# Patient Record
Sex: FEMALE | Race: WHITE | NOT HISPANIC OR LATINO | URBAN - METROPOLITAN AREA
[De-identification: names, ages, dates, MRNs, and addresses within clinical notes are randomized per-mention and may not be internally consistent; named-entity substitution may affect disease eponyms.]

---

## 2017-12-05 ENCOUNTER — EMERGENCY (EMERGENCY)
Facility: HOSPITAL | Age: 62
LOS: 1 days | Discharge: HOME | End: 2017-12-05
Attending: EMERGENCY MEDICINE | Admitting: EMERGENCY MEDICINE
Payer: MEDICAID

## 2017-12-05 VITALS
HEART RATE: 90 BPM | TEMPERATURE: 98 F | DIASTOLIC BLOOD PRESSURE: 75 MMHG | SYSTOLIC BLOOD PRESSURE: 119 MMHG | OXYGEN SATURATION: 96 % | RESPIRATION RATE: 18 BRPM

## 2017-12-05 PROCEDURE — 99283 EMERGENCY DEPT VISIT LOW MDM: CPT

## 2017-12-05 NOTE — ED PROVIDER NOTE - PLAN OF CARE
You were seen today for follow up of your cat bite. You are healing well. Please continue to take the Augmentin twice per day as indicated. Please take ibuprofen up to 600mg every 6 hours for pain as needed. Please elevate the arm. Come back to the emergency room if you start having fevers, chills, worsening pain, numbness, tingling, difficulty rotating arm at elbow or wrist, or for any other emergent reason.  Please follow up with your primary care doctor in the next three days to check on arm.

## 2017-12-05 NOTE — ED PROVIDER NOTE - MEDICAL DECISION MAKING DETAILS
62F presenting for wound check s/p cat bite on 12/3. healing well. Will encourage to elevate, continue antibiotics, take ibuprofen for pain. Return precautions given. F/u with PMD, 62F presenting for wound check s/p cat bite on 12/3. healing well. No Fever/DM, Will encourage to elevate, continue antibiotics, take ibuprofen for pain. Return precautions given. F/u with PMD,

## 2017-12-05 NOTE — ED ADULT TRIAGE NOTE - CHIEF COMPLAINT QUOTE
right arm bite wound treated at [facility] with augmentin from 12/3. Came in due to increasing pain.

## 2017-12-05 NOTE — ED PROVIDER NOTE - MUSCULOSKELETAL MINIMAL EXAM
R arm: +erythema of forearm with puncture wounds that are closing up. Tenderness to palpation over the areas of erythema. No fluctuance or pus can be expressed.

## 2017-12-05 NOTE — ED PROVIDER NOTE - OBJECTIVE STATEMENT
62F PMH chronic back pain, hypothyroidism, hypertension, anxiety presents status post cat bite she sustained on 12/3. Patient went  for wound check today at Urgent Care and had a lot of pus expressed and was told to come to the ED. Patient is able to move arm at elbow and wrist. She is taking Augmentin, but has increased her dose by herself from 2 pills per day to 3. Has been taking oxycodone without relief of pain. Denies fevers and chills.

## 2017-12-05 NOTE — ED PROVIDER NOTE - ATTENDING CONTRIBUTION TO CARE
62F presenting for wound check s/p cat bite on 12/3. healing well. No Fever/DM, Will encourage to elevate, continue antibiotics, take ibuprofen for pain. Return precautions given. F/u with PMD,

## 2017-12-05 NOTE — ED ADULT NURSE NOTE - CHIEF COMPLAINT QUOTE
right arm bite wound treated at [facility] with augmentin from 12/3. Came in due to increasing pain. (+) redness. Afebrile

## 2017-12-05 NOTE — ED PROVIDER NOTE - CARE PLAN
Principal Discharge DX:	Cat bite, initial encounter  Instructions for follow-up, activity and diet:	You were seen today for follow up of your cat bite. You are healing well. Please continue to take the Augmentin twice per day as indicated. Please take ibuprofen up to 600mg every 6 hours for pain as needed. Please elevate the arm. Come back to the emergency room if you start having fevers, chills, worsening pain, numbness, tingling, difficulty rotating arm at elbow or wrist, or for any other emergent reason.  Please follow up with your primary care doctor in the next three days to check on arm.

## 2017-12-06 ENCOUNTER — EMERGENCY (EMERGENCY)
Facility: HOSPITAL | Age: 62
LOS: 1 days | Discharge: HOME | End: 2017-12-06
Attending: EMERGENCY MEDICINE | Admitting: EMERGENCY MEDICINE
Payer: MEDICAID

## 2017-12-06 VITALS
OXYGEN SATURATION: 100 % | RESPIRATION RATE: 20 BRPM | SYSTOLIC BLOOD PRESSURE: 151 MMHG | TEMPERATURE: 99 F | HEART RATE: 87 BPM | DIASTOLIC BLOOD PRESSURE: 76 MMHG

## 2017-12-06 VITALS
OXYGEN SATURATION: 96 % | RESPIRATION RATE: 18 BRPM | DIASTOLIC BLOOD PRESSURE: 65 MMHG | TEMPERATURE: 98 F | HEART RATE: 88 BPM | SYSTOLIC BLOOD PRESSURE: 136 MMHG

## 2017-12-06 LAB
ALBUMIN SERPL ELPH-MCNC: 4.4 G/DL — SIGNIFICANT CHANGE UP (ref 3.3–5)
ALP SERPL-CCNC: 73 U/L — SIGNIFICANT CHANGE UP (ref 40–120)
ALT FLD-CCNC: 15 U/L RC — SIGNIFICANT CHANGE UP (ref 10–45)
ANION GAP SERPL CALC-SCNC: 13 MMOL/L — SIGNIFICANT CHANGE UP (ref 5–17)
AST SERPL-CCNC: 16 U/L — SIGNIFICANT CHANGE UP (ref 10–40)
BASE EXCESS BLDV CALC-SCNC: 2.7 MMOL/L — HIGH (ref -2–2)
BILIRUB SERPL-MCNC: 0.2 MG/DL — SIGNIFICANT CHANGE UP (ref 0.2–1.2)
BUN SERPL-MCNC: 8 MG/DL — SIGNIFICANT CHANGE UP (ref 7–23)
CA-I SERPL-SCNC: 1.28 MMOL/L — SIGNIFICANT CHANGE UP (ref 1.12–1.3)
CALCIUM SERPL-MCNC: 9.9 MG/DL — SIGNIFICANT CHANGE UP (ref 8.4–10.5)
CHLORIDE BLDV-SCNC: 102 MMOL/L — SIGNIFICANT CHANGE UP (ref 96–108)
CHLORIDE SERPL-SCNC: 100 MMOL/L — SIGNIFICANT CHANGE UP (ref 96–108)
CO2 BLDV-SCNC: 31 MMOL/L — HIGH (ref 22–30)
CO2 SERPL-SCNC: 27 MMOL/L — SIGNIFICANT CHANGE UP (ref 22–31)
CREAT SERPL-MCNC: 0.88 MG/DL — SIGNIFICANT CHANGE UP (ref 0.5–1.3)
GAS PNL BLDV: 139 MMOL/L — SIGNIFICANT CHANGE UP (ref 136–145)
GAS PNL BLDV: SIGNIFICANT CHANGE UP
GAS PNL BLDV: SIGNIFICANT CHANGE UP
GLUCOSE BLDV-MCNC: 107 MG/DL — HIGH (ref 70–99)
GLUCOSE SERPL-MCNC: 106 MG/DL — HIGH (ref 70–99)
HCO3 BLDV-SCNC: 29 MMOL/L — SIGNIFICANT CHANGE UP (ref 21–29)
HCT VFR BLD CALC: 40.6 % — SIGNIFICANT CHANGE UP (ref 34.5–45)
HCT VFR BLDA CALC: 42 % — SIGNIFICANT CHANGE UP (ref 39–50)
HGB BLD CALC-MCNC: 13.6 G/DL — SIGNIFICANT CHANGE UP (ref 11.5–15.5)
HGB BLD-MCNC: 13.9 G/DL — SIGNIFICANT CHANGE UP (ref 11.5–15.5)
LACTATE BLDV-MCNC: 1.1 MMOL/L — SIGNIFICANT CHANGE UP (ref 0.7–2)
MCHC RBC-ENTMCNC: 33.2 PG — SIGNIFICANT CHANGE UP (ref 27–34)
MCHC RBC-ENTMCNC: 34.3 GM/DL — SIGNIFICANT CHANGE UP (ref 32–36)
MCV RBC AUTO: 96.9 FL — SIGNIFICANT CHANGE UP (ref 80–100)
PCO2 BLDV: 56 MMHG — HIGH (ref 35–50)
PH BLDV: 7.34 — LOW (ref 7.35–7.45)
PLATELET # BLD AUTO: 254 K/UL — SIGNIFICANT CHANGE UP (ref 150–400)
PO2 BLDV: 16 MMHG — LOW (ref 25–45)
POTASSIUM BLDV-SCNC: 4.2 MMOL/L — SIGNIFICANT CHANGE UP (ref 3.5–5)
POTASSIUM SERPL-MCNC: 4.4 MMOL/L — SIGNIFICANT CHANGE UP (ref 3.5–5.3)
POTASSIUM SERPL-SCNC: 4.4 MMOL/L — SIGNIFICANT CHANGE UP (ref 3.5–5.3)
PROT SERPL-MCNC: 7.7 G/DL — SIGNIFICANT CHANGE UP (ref 6–8.3)
RBC # BLD: 4.19 M/UL — SIGNIFICANT CHANGE UP (ref 3.8–5.2)
RBC # FLD: 11.2 % — SIGNIFICANT CHANGE UP (ref 10.3–14.5)
SAO2 % BLDV: 23 % — LOW (ref 67–88)
SODIUM SERPL-SCNC: 140 MMOL/L — SIGNIFICANT CHANGE UP (ref 135–145)
WBC # BLD: 7.3 K/UL — SIGNIFICANT CHANGE UP (ref 3.8–10.5)
WBC # FLD AUTO: 7.3 K/UL — SIGNIFICANT CHANGE UP (ref 3.8–10.5)

## 2017-12-06 PROCEDURE — 99284 EMERGENCY DEPT VISIT MOD MDM: CPT

## 2017-12-06 PROCEDURE — 85014 HEMATOCRIT: CPT

## 2017-12-06 PROCEDURE — 86140 C-REACTIVE PROTEIN: CPT

## 2017-12-06 PROCEDURE — 85027 COMPLETE CBC AUTOMATED: CPT

## 2017-12-06 PROCEDURE — 84295 ASSAY OF SERUM SODIUM: CPT

## 2017-12-06 PROCEDURE — 84132 ASSAY OF SERUM POTASSIUM: CPT

## 2017-12-06 PROCEDURE — 99284 EMERGENCY DEPT VISIT MOD MDM: CPT | Mod: 25

## 2017-12-06 PROCEDURE — 73080 X-RAY EXAM OF ELBOW: CPT | Mod: 26,RT

## 2017-12-06 PROCEDURE — 73080 X-RAY EXAM OF ELBOW: CPT

## 2017-12-06 PROCEDURE — 80053 COMPREHEN METABOLIC PANEL: CPT

## 2017-12-06 PROCEDURE — 85652 RBC SED RATE AUTOMATED: CPT

## 2017-12-06 PROCEDURE — 82330 ASSAY OF CALCIUM: CPT

## 2017-12-06 PROCEDURE — 82947 ASSAY GLUCOSE BLOOD QUANT: CPT

## 2017-12-06 PROCEDURE — 83605 ASSAY OF LACTIC ACID: CPT

## 2017-12-06 PROCEDURE — 82803 BLOOD GASES ANY COMBINATION: CPT

## 2017-12-06 PROCEDURE — 82435 ASSAY OF BLOOD CHLORIDE: CPT

## 2017-12-06 RX ORDER — SODIUM CHLORIDE 9 MG/ML
500 INJECTION INTRAMUSCULAR; INTRAVENOUS; SUBCUTANEOUS ONCE
Qty: 0 | Refills: 0 | Status: COMPLETED | OUTPATIENT
Start: 2017-12-06 | End: 2017-12-06

## 2017-12-06 RX ORDER — IBUPROFEN 200 MG
600 TABLET ORAL ONCE
Qty: 0 | Refills: 0 | Status: COMPLETED | OUTPATIENT
Start: 2017-12-06 | End: 2017-12-06

## 2017-12-06 RX ADMIN — SODIUM CHLORIDE 500 MILLILITER(S): 9 INJECTION INTRAMUSCULAR; INTRAVENOUS; SUBCUTANEOUS at 22:38

## 2017-12-06 RX ADMIN — Medication 600 MILLIGRAM(S): at 23:16

## 2017-12-06 RX ADMIN — Medication 100 MILLIGRAM(S): at 22:45

## 2017-12-06 RX ADMIN — Medication 600 MILLIGRAM(S): at 22:32

## 2017-12-06 NOTE — ED PROVIDER NOTE - PROGRESS NOTE DETAILS
Pain, swelling, and erythema improved significantly after nsaids and abx.  Long d/w her re abx and return precautions.  Recommended CDU for continued IV abx and observation/monitoring but she declined stating that her diabetic cat needed her at home.  Long d/w her re r/b/a.  She continues to decline obs.  Will d/c on clinda, strict return precautions and wound care instructions provided.  --ALISONM

## 2017-12-06 NOTE — ED PROVIDER NOTE - OBJECTIVE STATEMENT
Patient had a cat bite to her forearm on Dec 3, and was evaluated here 2 days ago and today, as well as at an urgent care. When ecvaluated here she was found to have cellulitis and discharged on antibiotics. Patient got worried when evaluated at the urgent care so came to us for re-evaluation. She reports that the redness and pain extended up her elbow and that the swel;ling around the elbow area has increased in size. No fevers or chills. SHe is not a diabetic. Uptodate on her tetanus.

## 2017-12-06 NOTE — ED ADULT NURSE NOTE - OBJECTIVE STATEMENT
61 y/o female A&Ox4, PMH anxiety, GERD, HTN, migraines, presents to ED with c/o worsening pain and swelling in right elbow s/p cat bit on the third. Pt states she was treated here and sent home on Augmentin and has been taking it 3x/day. Pt states she noticed worsening symptoms and went to Urgent Care who referred her to ED for follow up. Bite site is wrapped, dressing c/d/i. Pt states she's able to move arm but with great pain, rated 10/10. C/O numbness in right hand. Pt is otherwise well appearing, alert and ambulatory without any other complaints.

## 2017-12-06 NOTE — ED PROVIDER NOTE - MEDICAL DECISION MAKING DETAILS
Patient with redness from a cat bite for the past 3 days o antibiotics with possible increase in the size of the redness. Will do CBC and inflammatory amrkers, then give IV antibiotics and do an Xray to r/o gas. Patient with redness from a cat bite for the past 3 days o antibiotics with possible increase in the size of the redness. Will do CBC and inflammatory amrkers, then give IV antibiotics and do an Xray to r/o nec fasc.    Attending Statement: Agree with the above.  Likely MRSA given abscess that is draining.  No improvement c augmentin.  Will give clinda, fluids, check labs, and XR to eval for subcutaneous air.  Reassess.  CDU admission recommended but patient declines.  See progress note.  --BMM

## 2017-12-06 NOTE — ED PROVIDER NOTE - CARE PLAN
Principal Discharge DX:	Cellulitis of other specified site Principal Discharge DX:	Cellulitis of other specified site  Goal:	Left forearm

## 2017-12-07 LAB — CRP SERPL-MCNC: 2.2 MG/DL — HIGH (ref 0–0.4)

## 2018-05-28 ENCOUNTER — EMERGENCY (EMERGENCY)
Facility: HOSPITAL | Age: 63
LOS: 1 days | Discharge: HOME | End: 2018-05-28
Attending: EMERGENCY MEDICINE
Payer: MEDICAID

## 2018-05-28 VITALS
SYSTOLIC BLOOD PRESSURE: 150 MMHG | RESPIRATION RATE: 16 BRPM | OXYGEN SATURATION: 99 % | HEART RATE: 70 BPM | TEMPERATURE: 98 F | DIASTOLIC BLOOD PRESSURE: 71 MMHG

## 2018-05-28 VITALS
DIASTOLIC BLOOD PRESSURE: 97 MMHG | SYSTOLIC BLOOD PRESSURE: 161 MMHG | HEART RATE: 78 BPM | RESPIRATION RATE: 16 BRPM | OXYGEN SATURATION: 98 % | TEMPERATURE: 98 F

## 2018-05-28 PROCEDURE — 99282 EMERGENCY DEPT VISIT SF MDM: CPT

## 2018-05-28 NOTE — ED ADULT NURSE NOTE - OBJECTIVE STATEMENT
62 y/o female presenting to ED stating she needs a prescription refill. Pt states she had an incident with her daughter, and was advised by police to come her to obtain medication. Safety and comfort measures maintained.

## 2018-05-28 NOTE — ED PROVIDER NOTE - PHYSICAL EXAMINATION
Pt in NAD, A&O x3. CN 2-12 grossly intact. Head NCAT, sclera white without injection, PERRLA, EOMI. Nares patent, turbinates without edema or erythema, no polyps or septal deviation. No auricular tenderness. Mouth and pharynx without erythema or exudates, uvula midline, no tonsillar enlargement. Trachea midline, no lymphadenopathy, no obvious JVD. No chest wall tenderness, CTA anterior and posterior b/l, no wheezes, rales, or rhonchi. Clear distinct S1, S2, regular rate, no S3, S4, murmurs, gallops, or rubs. Abdomen soft non-tender. No CVAT b/l. No midline or paraspinal back pain. No LE edema Pt in NAD, A&O x3. CN 2-12 grossly intact. Head NCAT, sclera white without injection, PERRLA, EOMI. Nares patent, turbinates without edema or erythema, no polyps or septal deviation. No auricular tenderness. Mouth and pharynx without erythema or exudates, uvula midline, no tonsillar enlargement. Trachea midline, no lymphadenopathy, no obvious JVD. No chest wall tenderness, CTA anterior and posterior b/l, b/l wheezes cleared with cough, rales, or rhonchi. Clear distinct S1, S2, regular rate, no S3, S4, murmurs, gallops, or rubs. Abdomen soft non-tender. No CVAT b/l. No midline or paraspinal back pain. No LE edema

## 2018-05-28 NOTE — ED PROVIDER NOTE - MEDICAL DECISION MAKING DETAILS
Pt with no complaints, nl physical exam and vitals wnl presents to ED requesting med refill, multiple controlled substances. Pt instructed to f/u with PCP tomorrow.

## 2018-05-28 NOTE — ED PROVIDER NOTE - CARE PLAN
Principal Discharge DX:	Worried well  Assessment and plan of treatment:	Follow-up with your primary care provider tomorrow for medication refill.  Continue to take all other medications as directed.  Return to the emergency room immediately if your symptoms worsen or persist, or if you have a high or concerning fever, Headache, change in vision, chest pain, back pain, palpitations, severe vomiting, loss of consciousness (passing out) or if any other concerning or questionable symptoms.

## 2018-05-28 NOTE — ED PROVIDER NOTE - ATTENDING CONTRIBUTION TO CARE
64yo F with medication refill.  Pt states she does not have access to her meds.  She is on htn meds along with narcotics and valium.  She states her pain management doctor is unavailable and her pcp prescribed 5 days of meds that would have lasted her until Saturday.  She denies any sx.  NAD, completely nontoxic, afebrile, NCAT, PERRL, CN grossly intact, equal chest rise, abd soft nontender with no rebound/guarding/masses, no LE edema.  Pt bp stable, advised to f/u with pcp tomm and return if worse.  Pt agreeable to plan.

## 2018-05-28 NOTE — ED PROVIDER NOTE - PLAN OF CARE
Follow-up with your primary care provider tomorrow for medication refill.  Continue to take all other medications as directed.  Return to the emergency room immediately if your symptoms worsen or persist, or if you have a high or concerning fever, Headache, change in vision, chest pain, back pain, palpitations, severe vomiting, loss of consciousness (passing out) or if any other concerning or questionable symptoms.

## 2018-05-28 NOTE — ED PROVIDER NOTE - OBJECTIVE STATEMENT
62 y/o female h/o HTN, HLD, hypothyroid, "irregular heart" and "something with the spine" presents requesting prescription medication refill. States she had an incident with her schizophrenic drug addicted daughter yesterday that involved her going to the police precinct and court. Her medications are at home, but she currently has a police order and cannot go into her own home until tomorrow, bc her daughter is there. Requesting: Mobic, Cozar, Reglan, Valium, Adderall, Oxycodone, meclizine, simvastatin, metoprolol, and synthroid. Pt denies any current sxs including syncope, dizziness, lightheadedness, change in vision, chest/back/abdo pain, HA, palpitations, numbness, paresthesias, or other pain. 64 y/o female h/o HTN, HLD, hypothyroid, "irregular heart" and "something with the spine" presents requesting prescription medication refill. States she had an incident with her schizophrenic drug addicted daughter yesterday that involved her going to the police precinct and court. Her medications are at home, but she currently has a police order and cannot go into her own home until tomorrow, bc her daughter is there. Requesting: Mobic, Cozar, Reglan, Valium, Adderall, Oxycodone, meclizine, simvastatin, metoprolol, and synthroid. Pt denies any current sxs including syncope, dizziness, lightheadedness, change in vision, chest/back/abdo pain, HA, palpitations, numbness, paresthesias, or other new pain outside baseline.

## 2018-12-21 NOTE — ED ADULT NURSE NOTE - NS PRO PASSIVE SMOKE EXP
Phone Numbers:   [phone] (W)  [phone] (M)    Preferred Language:  English [1]    [email or web address]    Payor: [employer] / Plan: CHOICE PLUS/b-datum / Product Type: PPO MISC   Specialty Referral Req'd?:  N/A  Secondary?   No  Work Comp?:  No    No Pcp    needs pre op   Phone: None  Fax: None    Estimated body mass index is 40.35 kg/m² as calculated from the following:    Height as of 11/6/18: 5' 6\" (1.676 m).    Weight as of 11/6/18: 113.4 kg.    Scheduling at [facility]?:  No    PreOp Diagnosis:   Internal derangement of right shoulder  - Primary M24.811         Orders from Provider (Copied from Message or CC Chart):  ----- Message -----  From: Neftaly Donato MD  Sent: 12/20/2018  12:22 PM  To: Ortho Surg Sched Pool - Tanmay Higginbotham, *  Subject: right shoulder scope                             Procedure and Consent to read: Right shoulder arthroscopic biceps tenodesis, rotator cuff repair with augmentation right shoulder arthroscopic subacromial decompression  Diagnosis: Right shoulder rotator cuff tear right shoulder biceps tendinitis  ICD-9 or ICD-10: m24.819   CPT: 35836,74778, 99764  Tentative Date: 1/19  Duration of Procedure: 1.5 hours  Hospital: [facility]  Anesthesia: General and Regional-Emely Block  NP/PA Assist: YES  Length of Stay: Day Surgery  Position: Supine  X-ray Required: None  Films Needed for Operating Room:  Equipment: regeneten by davenport and nephew, arthrex anchors  Durable Medical Equipment: Shoulder immoblilizer, Cold Therapy, CPM (continuous passive motion  Postoperative Therapy: Occupational Therapy to evaluate and treat day 5  Postoperative Dressing: soft  Preoperative Done By: pcp  Consent: To be signed at hospital    Hospital Orders: Antibiotics Ancef 2 gram IV. If patient is allergic to PCN, please give 900 mg of clindamycin    Please make first postop office visit for 14 days postop  Anesthesia Orders  NPO  CXR within 6 months  Comprehensive Metabolic Panel  CBC with auto diff  - if not done 14 days prior to admission  UA with culture  INR if patient is on coumadin  EKG within 6 months (males over 40 yrs old/females over 50 yrs old)  Type and Screen for total joints  Urine pregnancy test for women of child-bearing age only  Medical consultation for preop risk assessment (if primary care provider does not perform)  Knee high SILVIO stockings and SCD's for all arthroplasties, arthroscopic shoulder surgeries and procedures scheduled for 1-1/2 hours or more    Patient to  hibiclens  No makeup, no artificial nails, no jewelry  *Nasal swab for MSSA and MRSA on all total joint replacements       Unknown

## 2020-11-28 NOTE — ED PROVIDER NOTE - GASTROINTESTINAL NEGATIVE STATEMENT, MLM
no abdominal pain, no bloating, no constipation, no diarrhea, no nausea and no vomiting. (1) Other Diagnosis

## 2021-05-07 DIAGNOSIS — L02.416 CUTANEOUS ABSCESS OF LEFT LOWER LIMB: ICD-10-CM

## 2022-02-10 PROBLEM — I10 ESSENTIAL (PRIMARY) HYPERTENSION: Chronic | Status: ACTIVE | Noted: 2017-12-05

## 2022-02-28 ENCOUNTER — APPOINTMENT (OUTPATIENT)
Dept: GASTROENTEROLOGY | Facility: CLINIC | Age: 67
End: 2022-02-28
Payer: MEDICARE

## 2022-02-28 VITALS
BODY MASS INDEX: 67.81 KG/M2 | HEIGHT: 55 IN | DIASTOLIC BLOOD PRESSURE: 70 MMHG | SYSTOLIC BLOOD PRESSURE: 115 MMHG | WEIGHT: 293 LBS

## 2022-02-28 VITALS — RESPIRATION RATE: 12 BRPM | HEART RATE: 80 BPM

## 2022-02-28 DIAGNOSIS — J40 BRONCHITIS, NOT SPECIFIED AS ACUTE OR CHRONIC: ICD-10-CM

## 2022-02-28 PROCEDURE — 99214 OFFICE O/P EST MOD 30 MIN: CPT

## 2022-02-28 NOTE — HISTORY OF PRESENT ILLNESS
[___ Month(s) Ago] : [unfilled] month(s) ago [Heartburn] : stable heartburn [Nausea] : stable nausea [Vomiting] : denies vomiting [Diarrhea] : denies diarrhea [Constipation] : denies constipation [Yellow Skin Or Eyes (Jaundice)] : denies jaundice [Abdominal Pain] : denies abdominal pain [Abdominal Swelling] : denies abdominal swelling [Rectal Pain] : denies rectal pain [GERD] : gastroesophageal reflux disease [Hiatus Hernia] : no hiatus hernia [Peptic Ulcer Disease] : no peptic ulcer disease [Pancreatitis] : no pancreatitis [Cholelithiasis] : no cholelithiasis [Kidney Stone] : no kidney stone [Inflammatory Bowel Disease] : no inflammatory bowel disease [Irritable Bowel Syndrome] : no irritable bowel syndrome [Diverticulitis] : no diverticulitis [Alcohol Abuse] : no alcohol abuse [Malignancy] : no malignancy [Abdominal Surgery] : no abdominal surgery [Appendectomy] : no appendectomy [Cholecystectomy] : no cholecystectomy [_________] : Performed [unfilled] [de-identified] : 66 yo female, hx of gerd, occasional nausea and hemorrhoidal pruritus. Due for colonoscopy at this time, as last prep was fair. No change in bowel habits, no weight loss ; PT  is currently smoking 1/2 PPD.

## 2022-02-28 NOTE — CONSULT LETTER
[Dear  ___] : Dear  [unfilled], [Courtesy Letter:] : I had the pleasure of seeing your patient, [unfilled], in my office today. [Please see my note below.] : Please see my note below. [Consult Closing:] : Thank you very much for allowing me to participate in the care of this patient.  If you have any questions, please do not hesitate to contact me. [Sincerely,] : Sincerely, [FreeTextEntry3] : Guevara Khan MD, FACP, AGAF, FAASLD\par  of Medicine\par [facility]\par

## 2022-02-28 NOTE — ASSESSMENT
[FreeTextEntry1] : 66 yo female chronic gerd, occ nausea, with external hemorrhoids. She is due for colonoscopy at this time. Will treat empirically with PPI, topical proctozone. Pt to be reevaluated in 6 weeks at which time she states she will schedule her colonoscopy.

## 2022-02-28 NOTE — PHYSICAL EXAM
[General Appearance - Alert] : alert [General Appearance - In No Acute Distress] : in no acute distress [Sclera] : the sclera and conjunctiva were normal [PERRL With Normal Accommodation] : pupils were equal in size, round, and reactive to light [Extraocular Movements] : extraocular movements were intact [Outer Ear] : the ears and nose were normal in appearance [Neck Appearance] : the appearance of the neck was normal [Oropharynx] : the oropharynx was normal [Neck Cervical Mass (___cm)] : no neck mass was observed [Jugular Venous Distention Increased] : there was no jugular-venous distention [Thyroid Diffuse Enlargement] : the thyroid was not enlarged [Thyroid Nodule] : there were no palpable thyroid nodules [Auscultation Breath Sounds / Voice Sounds] : lungs were clear to auscultation bilaterally [Heart Rate And Rhythm] : heart rate was normal and rhythm regular [Heart Sounds] : normal S1 and S2 [Heart Sounds Gallop] : no gallops [Murmurs] : no murmurs [Heart Sounds Pericardial Friction Rub] : no pericardial rub [Full Pulse] : the pedal pulses are present [Edema] : there was no peripheral edema [Bowel Sounds] : normal bowel sounds [Abdomen Soft] : soft [Abdomen Tenderness] : non-tender [] : no hepato-splenomegaly [Abdomen Mass (___ Cm)] : no abdominal mass palpated [Normal Sphincter Tone] : normal sphincter tone [No Rectal Mass] : no rectal mass [External Hemorrhoid] : external hemorrhoids [Cervical Lymph Nodes Enlarged Posterior Bilaterally] : posterior cervical [Cervical Lymph Nodes Enlarged Anterior Bilaterally] : anterior cervical [Supraclavicular Lymph Nodes Enlarged Bilaterally] : supraclavicular [Axillary Lymph Nodes Enlarged Bilaterally] : axillary [Femoral Lymph Nodes Enlarged Bilaterally] : femoral [Inguinal Lymph Nodes Enlarged Bilaterally] : inguinal [No CVA Tenderness] : no ~M costovertebral angle tenderness [No Spinal Tenderness] : no spinal tenderness [Abnormal Walk] : normal gait [Nail Clubbing] : no clubbing  or cyanosis of the fingernails [Musculoskeletal - Swelling] : no joint swelling seen [Motor Tone] : muscle strength and tone were normal [Impaired Insight] : insight and judgment were intact [Oriented To Time, Place, And Person] : oriented to person, place, and time [Affect] : the affect was normal

## 2022-03-11 NOTE — ED ADULT NURSE NOTE - PT NEEDS ASSIST
Routing to Dr Cortez.             Called patient and she was at work and could not schedule an appointment right now.  Patient also said that no one had called her from mental health scheduling to schedule a therapy appointment.  I relayed Dr Cortez message below.    SALO Holland  [facility]     no

## 2022-03-12 NOTE — ED ADULT NURSE NOTE - NSSISCREENINGQ3_ED_A_ED
Chest Pain    Chest pain can be caused by many different conditions which may or may not be dangerous. Causes include heartburn, lung infections, heart attack, blood clot in lungs, skin infections, strain or damage to muscle, cartilage, or bones, etc. In addition to a history and physical examination, an electrocardiogram (ECG) or other lab tests may have been performed to determine the cause of your chest pain.    You have been diagnosed with Acute pericarditis. Please see handout for more information.     Please take Ibuprofen 600 mg every 6 hours as needed for pain.   You were prescribed colchicine, please take 1 tablet daily for 3 months.     Please follow up with your primary care provider or your cardiologist.    SEEK IMMEDIATE MEDICAL CARE IF YOU HAVE ANY OF THE FOLLOWING SYMPTOMS: worsening chest pain, coughing up blood, unexplained back/neck/jaw pain, severe abdominal pain, dizziness or lightheadedness, fainting, shortness of breath, sweaty or clammy skin, vomiting, or racing heart beat. These symptoms may represent a serious problem that is an emergency. Do not wait to see if the symptoms will go away. Get medical help right away. Call 911 and do not drive yourself to the hospital. Chest Pain    Chest pain can be caused by many different conditions which may or may not be dangerous. Causes include heartburn, lung infections, heart attack, blood clot in lungs, skin infections, strain or damage to muscle, cartilage, or bones, etc. In addition to a history and physical examination, an electrocardiogram (ECG) or other lab tests may have been performed to determine the cause of your chest pain.    You have been diagnosed with Acute pericarditis. Please see handout for more information.     Please take Ibuprofen 600 - 800 mg every 8 hours as needed for pain.   You were prescribed colchicine, please take 1 tablet daily for 3 months.     Please follow up with your primary care provider or your cardiologist.    SEEK IMMEDIATE MEDICAL CARE IF YOU HAVE ANY OF THE FOLLOWING SYMPTOMS: worsening chest pain, coughing up blood, unexplained back/neck/jaw pain, severe abdominal pain, dizziness or lightheadedness, fainting, shortness of breath, sweaty or clammy skin, vomiting, or racing heart beat. These symptoms may represent a serious problem that is an emergency. Do not wait to see if the symptoms will go away. Get medical help right away. Call 911 and do not drive yourself to the hospital. No

## 2022-04-14 ENCOUNTER — APPOINTMENT (OUTPATIENT)
Dept: GASTROENTEROLOGY | Facility: CLINIC | Age: 67
End: 2022-04-14
Payer: MEDICARE

## 2022-04-14 VITALS — HEART RATE: 70 BPM | RESPIRATION RATE: 12 BRPM

## 2022-04-14 VITALS — WEIGHT: 168 LBS | BODY MASS INDEX: 30.91 KG/M2 | HEIGHT: 62 IN

## 2022-04-14 DIAGNOSIS — K21.9 GASTRO-ESOPHAGEAL REFLUX DISEASE W/OUT ESOPHAGITIS: ICD-10-CM

## 2022-04-14 DIAGNOSIS — Z12.11 ENCOUNTER FOR SCREENING FOR MALIGNANT NEOPLASM OF COLON: ICD-10-CM

## 2022-04-14 DIAGNOSIS — K64.4 RESIDUAL HEMORRHOIDAL SKIN TAGS: ICD-10-CM

## 2022-04-14 PROCEDURE — 99214 OFFICE O/P EST MOD 30 MIN: CPT

## 2022-04-14 RX ORDER — POLYETHYLENE GLYCOL-3350 AND ELECTROLYTES WITH FLAVOR PACK 240; 5.84; 2.98; 6.72; 22.72 G/278.26G; G/278.26G; G/278.26G; G/278.26G; G/278.26G
240 POWDER, FOR SOLUTION ORAL
Qty: 1 | Refills: 0 | Status: ACTIVE | COMMUNITY
Start: 2022-04-14 | End: 1900-01-01

## 2022-04-14 RX ORDER — HYDROCORTISONE 2.5% 25 MG/G
2.5 CREAM TOPICAL DAILY
Qty: 1 | Refills: 2 | Status: ACTIVE | COMMUNITY
Start: 2022-02-28 | End: 1900-01-01

## 2022-04-14 RX ORDER — METRONIDAZOLE 500 MG/1
500 TABLET ORAL 3 TIMES DAILY
Qty: 30 | Refills: 0 | Status: DISCONTINUED | COMMUNITY
Start: 2021-05-07 | End: 2022-04-14

## 2022-04-14 RX ORDER — PANTOPRAZOLE 40 MG/1
40 TABLET, DELAYED RELEASE ORAL DAILY
Qty: 90 | Refills: 3 | Status: ACTIVE | COMMUNITY
Start: 2022-02-28 | End: 1900-01-01

## 2022-04-14 NOTE — REVIEW OF SYSTEMS
[As Noted in HPI] : as noted in HPI [Negative] : Heme/Lymph [Arthralgias] : arthralgias [FreeTextEntry7] : anal itching

## 2022-04-14 NOTE — ASSESSMENT
[FreeTextEntry1] : 66 yo female, hx of gerd, occasional nausea and hemorrhoidal pruritus. Due for colonoscopy at this time, as last prep was fair. No change in bowel habits, no weight loss ; PT  is currently smoking 1/2 PPD.\par \par recent fall with right knee injury.NO head injury. Never picked up PPI nor proctozone.\par

## 2022-04-14 NOTE — HISTORY OF PRESENT ILLNESS
[___ Month(s) Ago] : [unfilled] month(s) ago [Heartburn] : stable heartburn [Nausea] : stable nausea [Vomiting] : denies vomiting [Diarrhea] : denies diarrhea [Yellow Skin Or Eyes (Jaundice)] : denies jaundice [Constipation] : denies constipation [Abdominal Pain] : denies abdominal pain [Abdominal Swelling] : denies abdominal swelling [Rectal Pain] : denies rectal pain [GERD] : gastroesophageal reflux disease [_________] : Performed [unfilled] [Hiatus Hernia] : no hiatus hernia [Peptic Ulcer Disease] : no peptic ulcer disease [Pancreatitis] : no pancreatitis [Cholelithiasis] : no cholelithiasis [Kidney Stone] : no kidney stone [Inflammatory Bowel Disease] : no inflammatory bowel disease [Irritable Bowel Syndrome] : no irritable bowel syndrome [Diverticulitis] : no diverticulitis [Alcohol Abuse] : no alcohol abuse [Malignancy] : no malignancy [Abdominal Surgery] : no abdominal surgery [Appendectomy] : no appendectomy [Cholecystectomy] : no cholecystectomy [de-identified] : 68 yo female, hx of gerd, occasional nausea and hemorrhoidal pruritus. Due for colonoscopy at this time, as last prep was fair. No change in bowel habits, no weight loss ; PT  is currently smoking 1/2 PPD.\par \par recent fall with right knee injury.NO head injury. Never picked up PPI nor proctozone.

## 2022-04-14 NOTE — PHYSICAL EXAM
[General Appearance - Alert] : alert [General Appearance - In No Acute Distress] : in no acute distress [Sclera] : the sclera and conjunctiva were normal [PERRL With Normal Accommodation] : pupils were equal in size, round, and reactive to light [Extraocular Movements] : extraocular movements were intact [Outer Ear] : the ears and nose were normal in appearance [Oropharynx] : the oropharynx was normal [Neck Appearance] : the appearance of the neck was normal [Neck Cervical Mass (___cm)] : no neck mass was observed [Jugular Venous Distention Increased] : there was no jugular-venous distention [Thyroid Diffuse Enlargement] : the thyroid was not enlarged [Thyroid Nodule] : there were no palpable thyroid nodules [Auscultation Breath Sounds / Voice Sounds] : lungs were clear to auscultation bilaterally [Heart Rate And Rhythm] : heart rate was normal and rhythm regular [Heart Sounds] : normal S1 and S2 [Heart Sounds Gallop] : no gallops [Murmurs] : no murmurs [Heart Sounds Pericardial Friction Rub] : no pericardial rub [Full Pulse] : the pedal pulses are present [Edema] : there was no peripheral edema [Bowel Sounds] : normal bowel sounds [Abdomen Soft] : soft [Abdomen Tenderness] : non-tender [] : no hepato-splenomegaly [Abdomen Mass (___ Cm)] : no abdominal mass palpated [Normal Sphincter Tone] : normal sphincter tone [No Rectal Mass] : no rectal mass [External Hemorrhoid] : external hemorrhoids [Cervical Lymph Nodes Enlarged Posterior Bilaterally] : posterior cervical [Cervical Lymph Nodes Enlarged Anterior Bilaterally] : anterior cervical [Supraclavicular Lymph Nodes Enlarged Bilaterally] : supraclavicular [Axillary Lymph Nodes Enlarged Bilaterally] : axillary [Femoral Lymph Nodes Enlarged Bilaterally] : femoral [Inguinal Lymph Nodes Enlarged Bilaterally] : inguinal [No CVA Tenderness] : no ~M costovertebral angle tenderness [No Spinal Tenderness] : no spinal tenderness [Abnormal Walk] : normal gait [Nail Clubbing] : no clubbing  or cyanosis of the fingernails [Musculoskeletal - Swelling] : no joint swelling seen [Motor Tone] : muscle strength and tone were normal [Oriented To Time, Place, And Person] : oriented to person, place, and time [Impaired Insight] : insight and judgment were intact [Affect] : the affect was normal

## 2022-04-18 NOTE — ED ADULT NURSE NOTE - DISCHARGE DATE/TIME
Requesting refill on buspirone 7.5 mg  Last refill: 3/11  Last visit: 2/14  Follow up: 4/20  
28-May-2018 23:06

## 2022-04-20 ENCOUNTER — APPOINTMENT (OUTPATIENT)
Dept: ORTHOPEDIC SURGERY | Facility: CLINIC | Age: 67
End: 2022-04-20
Payer: MEDICARE

## 2022-04-20 VITALS
SYSTOLIC BLOOD PRESSURE: 139 MMHG | WEIGHT: 170 LBS | BODY MASS INDEX: 31.28 KG/M2 | DIASTOLIC BLOOD PRESSURE: 75 MMHG | HEIGHT: 62 IN | HEART RATE: 91 BPM

## 2022-04-20 DIAGNOSIS — M17.11 UNILATERAL PRIMARY OSTEOARTHRITIS, RIGHT KNEE: ICD-10-CM

## 2022-04-20 PROCEDURE — 72100 X-RAY EXAM L-S SPINE 2/3 VWS: CPT

## 2022-04-20 PROCEDURE — 20610 DRAIN/INJ JOINT/BURSA W/O US: CPT | Mod: RT

## 2022-04-20 PROCEDURE — 99204 OFFICE O/P NEW MOD 45 MIN: CPT | Mod: 25

## 2022-04-20 RX ORDER — CELECOXIB 200 MG/1
200 CAPSULE ORAL DAILY
Qty: 60 | Refills: 2 | Status: ACTIVE | COMMUNITY
Start: 2022-04-20 | End: 1900-01-01

## 2022-04-20 NOTE — PHYSICAL EXAM
[de-identified] : US Duplex Venous Lower Extremity DOS 4/12/22:\par \par 1) No evidence of right lower extremity DVT.\par \par \par X-rays Right Femur, Knee, and Tibia/Fibula DOS 4/13/22:\par \par 1) No acute radiographic findings involving the right femur, knee, tibia, or fibula.\par \par \par MRI Right Knee and Right Tibia/Fibula DOS 4/13/22:\par \par 1) Prominent bone marrow edema involving the medial tibial plateau extending to the proximal tibial metadiaphysis, consistent with an osseous contusion.\par 2) Mild subcutaneous edema along the anterior aspect of the right lower leg as well as mild to moderate circumferential subcutaneous edema about the mid to distal aspect of the lower leg.\par 3) Radial tear of the posterior horn of the medial meniscus adjacent to the posterior root attachment.\par 4) Diffuse intermediate grade cartilage wear involving the medial compartment articular surfaces.\par \par

## 2022-04-20 NOTE — DISCUSSION/SUMMARY
[de-identified] : This patient has had many x-rays and MRIs as far as her imaging there is no problem with her femur.  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  This will take time to heal.  The cortisone injection will help her meniscal.  Intra-articular pain but she will allow time for her tibia to heal return visit in 1 month.

## 2022-04-20 NOTE — PROCEDURE
[de-identified] : Procedure Note:\par \par Anatomic Location:  Right  Knee\par \par Diagnosis:  Arthritis\par \par Procedure:  Injection of 2cc  of Marcaine 0.25% plain and Celestone 1cc, 6mg\par \par Local Spray: Ethyl Chloride.\par \par \par Patient has consented for the procedure.\par \par Injection  through a lateral parapatella approach.\par \par Patient tolerated the procedure well.\par \par Patient instructed to call the office if any reaction, fever, chills, increased erythema or swelling.   [phone].

## 2022-04-20 NOTE — HISTORY OF PRESENT ILLNESS
[de-identified] : Ms. IDA VARGAS is a 67 year female who presents to office complaining of right knee pain since 4/1/22 when she slipped and fell on wet floor at home.\par Prior to this, she denies having history of knee pain.\par She describes a shooting pain from her proximal anterior thigh radiating down to her right ankle.\par Pain is aggravated with getting up from a seated position and putting pressure on her leg.\par She has h/o chronic lumbar spine "issues" (patient could not elaborate) for which she sees Dr. Willei Potter (neurologist) who has chronically prescribed her Oxycodone for this pain, which hasn't seemed to help.\par Patient also states a constant numbness/tingling sensation in her lower leg, from her right proximal tibia to her right ankle.\par Denies hip/groin pain at this time.\par All review of systems, family history, social history, surgical history, past medical history, medications, and allergies not previously stated as positive are negative. They were reviewed by me today with the patient and documented accordingly.

## 2022-05-24 ENCOUNTER — APPOINTMENT (OUTPATIENT)
Dept: ORTHOPEDIC SURGERY | Facility: CLINIC | Age: 67
End: 2022-05-24
Payer: MEDICARE

## 2022-05-24 VITALS — BODY MASS INDEX: 31.28 KG/M2 | HEIGHT: 62 IN | WEIGHT: 170 LBS

## 2022-05-24 DIAGNOSIS — M47.817 SPONDYLOSIS W/OUT MYELOPATHY OR RADICULOPATHY, LUMBOSACRAL REGION: ICD-10-CM

## 2022-05-24 PROCEDURE — 99213 OFFICE O/P EST LOW 20 MIN: CPT

## 2022-05-24 RX ORDER — CYCLOBENZAPRINE HYDROCHLORIDE 10 MG/1
10 TABLET, FILM COATED ORAL 3 TIMES DAILY
Qty: 60 | Refills: 0 | Status: ACTIVE | COMMUNITY
Start: 2022-05-24 | End: 1900-01-01

## 2022-05-24 RX ORDER — CELECOXIB 200 MG/1
200 CAPSULE ORAL DAILY
Qty: 60 | Refills: 3 | Status: ACTIVE | COMMUNITY
Start: 2022-05-24 | End: 1900-01-01

## 2022-05-24 NOTE — PHYSICAL EXAM
[de-identified] : Physical examination shows her deep tendon reflex motor and sensory are still symmetric.  Some of the discomfort she is having get a lower right leg may be consistent with a low back syndrome which she is aware of.  The duplex that she had did show some edema in the area of the medial tibial plateau she may well have a contusion of her right knee and could even have degenerative menisci in order to better visualize this a MRI will be done at.  Examination of her knee shows 0 to 130 degrees of flexion with good medial lateral knee anterior posterior stability but some tenderness along the right medial joint line. [de-identified] : US Duplex Venous Lower Extremity DOS 4/12/22:\par \par 1) No evidence of right lower extremity DVT.\par \par \par X-rays Right Femur, Knee, and Tibia/Fibula DOS 4/13/22:\par \par 1) No acute radiographic findings involving the right femur, knee, tibia, or fibula.\par \par \par MRI Right Knee and Right Tibia/Fibula DOS 4/13/22:\par \par 1) Prominent bone marrow edema involving the medial tibial plateau extending to the proximal tibial metadiaphysis, consistent with an osseous contusion.\par 2) Mild subcutaneous edema along the anterior aspect of the right lower leg as well as mild to moderate circumferential subcutaneous edema about the mid to distal aspect of the lower leg.\par 3) Radial tear of the posterior horn of the medial meniscus adjacent to the posterior root attachment.\par 4) Diffuse intermediate grade cartilage wear involving the medial compartment articular surfaces.\par \par

## 2022-05-24 NOTE — HISTORY OF PRESENT ILLNESS
[de-identified] : Ms. IDA VARGAS is a 67 year female who returns to office complaining of right knee pain since 4/1/22 when she slipped and fell on wet floor at home.\par Prior to this, she denies having history of knee pain.\par She describes a shooting pain from her proximal anterior thigh radiating down to her right ankle.\par Pain is aggravated with getting up from a seated position and putting pressure on her leg.\par She has h/o chronic lumbar spine "issues" (patient could not elaborate) for which she sees Dr. Willie Potter (neurologist) who has chronically prescribed her Oxycodone for this pain, which hasn't seemed to help.\par Patient also states a constant numbness/tingling sensation in her lower leg, from her right proximal tibia to her right ankle.\par Denies hip/groin pain at this time.\par Cortisone injection given to right knee on 4/20/22 which did not help her right knee pain.

## 2022-05-24 NOTE — DISCUSSION/SUMMARY
[de-identified] : This patient has had many x-rays and MRIs as far as her imaging there is no problem with her femur.  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  This will take time to heal.  She is doing slightly improved from previously she would like to get an MRI to better define her knee of the cartilages which will be done she will try Celebrex would 200 mg tablet a day and take a muscle antispasmodic once a day return visit in 2 months.

## 2022-06-12 ENCOUNTER — APPOINTMENT (OUTPATIENT)
Dept: MRI IMAGING | Facility: CLINIC | Age: 67
End: 2022-06-12

## 2022-06-14 PROBLEM — S89.90XA KNEE INJURY: Status: ACTIVE | Noted: 2022-04-14

## 2022-06-14 PROBLEM — M54.50 CHRONIC LOWER BACK PAIN: Status: ACTIVE | Noted: 2022-04-20

## 2022-06-15 ENCOUNTER — APPOINTMENT (OUTPATIENT)
Dept: ORTHOPEDIC SURGERY | Facility: CLINIC | Age: 67
End: 2022-06-15
Payer: MEDICARE

## 2022-06-15 ENCOUNTER — APPOINTMENT (OUTPATIENT)
Dept: ORTHOPEDIC SURGERY | Facility: CLINIC | Age: 67
End: 2022-06-15

## 2022-06-15 DIAGNOSIS — G89.29 LOW BACK PAIN, UNSPECIFIED: ICD-10-CM

## 2022-06-15 DIAGNOSIS — M54.50 LOW BACK PAIN, UNSPECIFIED: ICD-10-CM

## 2022-06-15 DIAGNOSIS — S89.90XA UNSPECIFIED INJURY OF UNSPECIFIED LOWER LEG, INITIAL ENCOUNTER: ICD-10-CM

## 2022-06-15 PROCEDURE — 99214 OFFICE O/P EST MOD 30 MIN: CPT

## 2022-06-15 NOTE — HISTORY OF PRESENT ILLNESS
[de-identified] : Ms. IDA VARGAS is a 67 year female who returns to office complaining of right knee pain since 4/1/22 when she slipped on laundry detergent and fell on wet floor in the basement of her co-op.\par Prior to this, she denies having history of knee pain.\par She describes a shooting pain from her proximal anterior thigh radiating down to her right ankle.\par Pain is aggravated with getting up from a seated position and putting pressure on her leg.\par She has h/o chronic lumbar spine "issues" (patient could not elaborate) for which she sees Dr. Willie Potter (neurologist) who has chronically prescribed her Oxycodone for this pain, which hasn't seemed to help.\par Patient also states a constant numbness/tingling sensation in her lower leg, from her right proximal tibia to her right ankle.\par Denies hip/groin pain at this time.\par Cortisone injection given to right knee on 4/20/22 which did not help her right knee pain very much.\par Celebrex prescribed on 5/24/22 which was not approved by her insurance company. She purchased this anyway, which she says hasn't helped.\par She was also prescribed Cyclobenzaprine, also not noticing relief when taking this.\par She states pain is not improving and wanted to review all of her MRI imaging in more detail at this time.

## 2022-06-15 NOTE — PHYSICAL EXAM
[de-identified] : Physical examination shows her deep tendon reflex motor and sensory are still symmetric.  Some of the discomfort she is having of the right leg may be consistent with a low back syndrome which she is aware of. The MRI that she did showed some edema in the area of the medial tibial plateau. Examination of her knee shows 0 to 130 degrees of flexion with good medial lateral knee anterior posterior stability but some tenderness along the right medial joint line. [de-identified] : US Duplex Venous Lower Extremity DOS 4/12/22:\par \par 1) No evidence of right lower extremity DVT.\par \par \par \par X-rays Right Femur, Knee, and Tibia/Fibula DOS 4/13/22:\par \par 1) No acute radiographic findings involving the right femur, knee, tibia, or fibula.\par \par \par \par MRI Right Knee and Right Tibia/Fibula DOS 4/13/22:\par \par 1) Prominent bone marrow edema involving the medial tibial plateau extending to the proximal tibial metadiaphysis, consistent with an osseous contusion.\par 2) Mild subcutaneous edema along the anterior aspect of the right lower leg as well as mild to moderate circumferential subcutaneous edema about the mid to distal aspect of the lower leg.\par 3) Radial tear of the posterior horn of the medial meniscus adjacent to the posterior root attachment.\par 4) Diffuse intermediate grade cartilage wear involving the medial compartment articular surfaces.

## 2022-06-15 NOTE — DISCUSSION/SUMMARY
[de-identified] : This patient has had many x-rays and MRIs. As far as her imaging, there is no problem with her femur.  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 This will take time to heal, as explained to patient previously. She is doing slightly better from previously. She will continue with Celebrex and Cyclobenzaprine PRN along with continued rest, ice, and compression of knee. She has been advised to follow up with her neurologist regarding workup of her lumbar spine, possibly being sent for lumbar spine MRI, which she agrees with doing. Return to office for re-evaluation in 2 months PRN.

## 2022-11-16 RX ORDER — SODIUM SULFATE, MAGNESIUM SULFATE, AND POTASSIUM CHLORIDE 17.75; 2.7; 2.25 G/1; G/1; G/1
1479-225-188 TABLET ORAL
Qty: 24 | Refills: 0 | Status: ACTIVE | COMMUNITY
Start: 2022-11-16 | End: 1900-01-01

## 2022-12-27 ENCOUNTER — APPOINTMENT (OUTPATIENT)
Dept: GASTROENTEROLOGY | Facility: AMBULATORY SURGERY CENTER | Age: 67
End: 2022-12-27
Payer: MEDICARE

## 2022-12-27 PROCEDURE — 45378 DIAGNOSTIC COLONOSCOPY: CPT | Mod: 53

## 2023-02-01 NOTE — ED PROVIDER NOTE - EYES NEGATIVE STATEMENT, MLM
1155-  pt. Arrived to pacu via bed from EP Lab. Pt. Is attached to monitor and alarms are on. Pt. Is drowsy from anesthesia but responds to verbal stimuli. Received report from ARIES MIX and JOHANNA Talley. Pt. Is wearing a simple mask at 8L. Pt. Is Afib on the monitor. Pt. Has bilateral groin sites. Sites are clean dry and intact. No hematoma, bleeding or drainage noted. Pt. Pulses post tib and pedal are +1. Skin is warm and dry. Pt. Has a Art line in left radial. Skin is warm and dry. Cap refill normal. Pt. Denies pain or n/v.    1305- Ekg and Echo has been completed. Pts. Left art line removed. Manual pressure to be held for 5 minutes. 1310- no bleeding, hematoma or drainage note at left radial. Occlusive dressing placed. 1318- Received amiodarone from pharmacy. pt. Started on amiodarone gtt. Per JOHANNA Talley during report pt. Had received bolus of amiodarone in EP lab. 1330- pt. At this time is ready to transfer to cathlab [facility]. Pt. Is awake and alert. She is wearing 2L via NC. Afib on the monitor. Pt. States has some lower back and hip pain from laying flat. Pt. Repositioned in the bed. She stated did help some. Pts. Bilateral groin sites are clean dry and intact. No bleeding drainage or hematoma noted. At this time pt. Is ready to transfer to Cath[facility] for continuation of care. Pt. Denied any other questions or concerns. 1337- gave bedside report to Kristen López RN in cathlab. no discharge, no irritation, no pain, no redness, and no visual changes.

## 2025-08-14 ENCOUNTER — NON-APPOINTMENT (OUTPATIENT)
Age: 70
End: 2025-08-14

## 2025-08-15 ENCOUNTER — NON-APPOINTMENT (OUTPATIENT)
Age: 70
End: 2025-08-15

## 2025-08-15 ENCOUNTER — APPOINTMENT (OUTPATIENT)
Dept: GASTROENTEROLOGY | Facility: CLINIC | Age: 70
End: 2025-08-15
Payer: MEDICARE

## 2025-08-15 VITALS
DIASTOLIC BLOOD PRESSURE: 70 MMHG | WEIGHT: 158 LBS | BODY MASS INDEX: 29.08 KG/M2 | HEIGHT: 62 IN | SYSTOLIC BLOOD PRESSURE: 130 MMHG | OXYGEN SATURATION: 98 % | RESPIRATION RATE: 12 BRPM | HEART RATE: 77 BPM

## 2025-08-15 DIAGNOSIS — Z12.11 ENCOUNTER FOR SCREENING FOR MALIGNANT NEOPLASM OF COLON: ICD-10-CM

## 2025-08-15 DIAGNOSIS — Z00.00 ENCOUNTER FOR GENERAL ADULT MEDICAL EXAMINATION W/OUT ABNORMAL FINDINGS: ICD-10-CM

## 2025-08-15 DIAGNOSIS — K21.9 GASTRO-ESOPHAGEAL REFLUX DISEASE W/OUT ESOPHAGITIS: ICD-10-CM

## 2025-08-15 DIAGNOSIS — R11.2 NAUSEA WITH VOMITING, UNSPECIFIED: ICD-10-CM

## 2025-08-15 DIAGNOSIS — R14.0 ABDOMINAL DISTENSION (GASEOUS): ICD-10-CM

## 2025-08-15 PROCEDURE — 99214 OFFICE O/P EST MOD 30 MIN: CPT

## 2025-08-27 ENCOUNTER — APPOINTMENT (OUTPATIENT)
Dept: GASTROENTEROLOGY | Facility: AMBULATORY SURGERY CENTER | Age: 70
End: 2025-08-27
Payer: MEDICARE

## 2025-08-27 ENCOUNTER — RESULT REVIEW (OUTPATIENT)
Age: 70
End: 2025-08-27

## 2025-08-27 PROCEDURE — 45380 COLONOSCOPY AND BIOPSY: CPT

## 2025-08-27 PROCEDURE — 43239 EGD BIOPSY SINGLE/MULTIPLE: CPT

## 2025-09-05 ENCOUNTER — NON-APPOINTMENT (OUTPATIENT)
Age: 70
End: 2025-09-05

## 2025-09-05 RX ORDER — FAMOTIDINE 40 MG/1
40 TABLET, FILM COATED ORAL
Qty: 90 | Refills: 2 | Status: ACTIVE | COMMUNITY
Start: 2025-09-05 | End: 1900-01-01

## 2025-09-05 RX ORDER — PANTOPRAZOLE 40 MG/1
40 TABLET, DELAYED RELEASE ORAL
Qty: 90 | Refills: 0 | Status: ACTIVE | COMMUNITY
Start: 2025-09-05 | End: 1900-01-01